# Patient Record
Sex: MALE | Race: WHITE
[De-identification: names, ages, dates, MRNs, and addresses within clinical notes are randomized per-mention and may not be internally consistent; named-entity substitution may affect disease eponyms.]

---

## 2022-03-01 ENCOUNTER — HOSPITAL ENCOUNTER (OUTPATIENT)
Dept: HOSPITAL 75 - RAD | Age: 67
End: 2022-03-01
Attending: INTERNAL MEDICINE
Payer: MEDICARE

## 2022-03-01 DIAGNOSIS — F17.210: ICD-10-CM

## 2022-03-01 DIAGNOSIS — Z12.2: Primary | ICD-10-CM

## 2022-03-01 DIAGNOSIS — R91.8: ICD-10-CM

## 2022-03-01 DIAGNOSIS — Z95.5: ICD-10-CM

## 2022-03-01 PROCEDURE — 71271 CT THORAX LUNG CANCER SCR C-: CPT

## 2022-03-01 NOTE — DIAGNOSTIC IMAGING REPORT
EXAMINATION: CT Lung Screening.



INDICATION: Screening for lung cancer, 48-pack-year history of

smoking, current smoker.



TECHNIQUE: Noncontrast, low-dose CT imaging performed according

to the lung cancer screening protocol. Auto Exposure Controls

were utilize during the CT exam to meet ALARA standards for

radiation dose reduction.



COMPARISON: None available.



FINDINGS: No significant adenopathy within the chest. No

aneurysmal dilatation of the thoracic aorta. Scattered vascular

calcifications, including extensive calcifications versus stents

within the coronary arteries. The heart is within normal limits

in size. No significant pericardial effusion. No significant

pleural effusion. The trachea is patent. No pneumothorax. Small

pleural-based 0.4 cm solid left lower lobe pulmonary nodule,

series 2, image 64. 0.6 x 0.4 cm pleural-based right lower lobe

pulmonary nodule adjacent to the fissure, series 2, image 110. No

additional suspicious pulmonary nodule or mass. Tiny

fat-containing diaphragmatic hernia noted posteriorly on the

right. 2.6 cm hypodensity within the superior pole of the left

kidney is partially visualized. Scattered osseous degenerative

changes. No acute osseous abnormality.



IMPRESSION: Sub-0.6 cm bilateral pulmonary nodules as described

above. Given size and appearance, these are felt to be benign in

appearance or behavior.



Significant vascular calcifications versus stents within the

coronary arteries.



Left renal hypodensity, only partially visualized. Statistically

this likely relates to a cyst, though this could be confirmed

with a renal ultrasound as it is technically indeterminate based

upon this examination.



LUNG-RADS CATEGORY: 2S: Benign appearance or behavior.

MODIFIER: S: Significant vascular calcifications versus advanced

coronary artery calcifications. Indeterminate left renal

hypodensity which could be further evaluated with a renal

ultrasound.

FOLLOW-UP: Continued annual low-dose CT of the chest in 12

months. Renal ultrasound could be performed to further evaluate

the partially visualized hypodensity within the left kidney if

this has not previously been evaluated.



Dictated by: 



  Dictated on workstation # [id number]

## 2022-03-10 ENCOUNTER — HOSPITAL ENCOUNTER (EMERGENCY)
Dept: HOSPITAL 75 - ER | Age: 67
Discharge: HOME | End: 2022-03-10
Payer: MEDICARE

## 2022-03-10 VITALS — BODY MASS INDEX: 26.89 KG/M2 | WEIGHT: 165.35 LBS | HEIGHT: 65.75 IN

## 2022-03-10 VITALS — DIASTOLIC BLOOD PRESSURE: 63 MMHG | SYSTOLIC BLOOD PRESSURE: 121 MMHG

## 2022-03-10 DIAGNOSIS — S50.811A: ICD-10-CM

## 2022-03-10 DIAGNOSIS — E11.22: ICD-10-CM

## 2022-03-10 DIAGNOSIS — S50.311A: Primary | ICD-10-CM

## 2022-03-10 DIAGNOSIS — E11.649: ICD-10-CM

## 2022-03-10 DIAGNOSIS — Z79.4: ICD-10-CM

## 2022-03-10 DIAGNOSIS — R07.89: ICD-10-CM

## 2022-03-10 DIAGNOSIS — D72.829: ICD-10-CM

## 2022-03-10 DIAGNOSIS — S50.812A: ICD-10-CM
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
## 2022-03-10 DIAGNOSIS — Z99.2: ICD-10-CM

## 2022-03-10 DIAGNOSIS — N18.6: ICD-10-CM

## 2022-03-10 LAB
ALBUMIN SERPL-MCNC: 4 GM/DL (ref 3.2–4.5)
ALP SERPL-CCNC: 214 U/L (ref 40–136)
ALT SERPL-CCNC: 11 U/L (ref 0–55)
ANISOCYTOSIS BLD QL SMEAR: SLIGHT
BASOPHILS # BLD AUTO: 0.1 10^3/UL (ref 0–0.1)
BASOPHILS NFR BLD AUTO: 0 % (ref 0–10)
BILIRUB SERPL-MCNC: 0.5 MG/DL (ref 0.1–1)
BUN/CREAT SERPL: 5
CALCIUM SERPL-MCNC: 10.1 MG/DL (ref 8.5–10.1)
CHLORIDE SERPL-SCNC: 98 MMOL/L (ref 98–107)
CO2 SERPL-SCNC: 22 MMOL/L (ref 21–32)
CREAT SERPL-MCNC: 9.29 MG/DL (ref 0.6–1.3)
EOSINOPHIL # BLD AUTO: 0 10^3/UL (ref 0–0.3)
EOSINOPHIL NFR BLD AUTO: 0 % (ref 0–10)
GFR SERPLBLD BASED ON 1.73 SQ M-ARVRAT: 6 ML/MIN
GLUCOSE SERPL-MCNC: 66 MG/DL (ref 70–105)
HCT VFR BLD CALC: 53 % (ref 40–54)
HGB BLD-MCNC: 17 G/DL (ref 13.3–17.7)
LYMPHOCYTES # BLD AUTO: 0.6 10^3/UL (ref 1–4)
LYMPHOCYTES NFR BLD AUTO: 4 % (ref 12–44)
MACROCYTES BLD QL SMEAR: SLIGHT
MANUAL DIFFERENTIAL PERFORMED BLD QL: YES
MCH RBC QN AUTO: 34 PG (ref 25–34)
MCHC RBC AUTO-ENTMCNC: 32 G/DL (ref 32–36)
MCV RBC AUTO: 105 FL (ref 80–99)
MICROCYTES BLD QL SMEAR: SLIGHT
MONOCYTES # BLD AUTO: 1.4 10^3/UL (ref 0–1)
MONOCYTES NFR BLD AUTO: 8 % (ref 0–12)
MONOCYTES NFR BLD: 8 %
NEUTROPHILS # BLD AUTO: 16 10^3/UL (ref 1.8–7.8)
NEUTROPHILS NFR BLD AUTO: 88 % (ref 42–75)
NEUTS BAND NFR BLD MANUAL: 83 %
NEUTS BAND NFR BLD: 1 %
PLATELET # BLD: 237 10^3/UL (ref 130–400)
PMV BLD AUTO: 9.9 FL (ref 9–12.2)
POLYCHROMASIA BLD QL SMEAR: SLIGHT
POTASSIUM SERPL-SCNC: 4.2 MMOL/L (ref 3.6–5)
PROT SERPL-MCNC: 6.8 GM/DL (ref 6.4–8.2)
SODIUM SERPL-SCNC: 142 MMOL/L (ref 135–145)
TOXIC GRANULES BLD QL SMEAR: (no result)
VARIANT LYMPHS NFR BLD MANUAL: 1 %
VARIANT LYMPHS NFR BLD MANUAL: 2 %
VARIANT LYMPHS NFR BLD MANUAL: 5 %
WBC # BLD AUTO: 18.2 10^3/UL (ref 4.3–11)

## 2022-03-10 PROCEDURE — 71045 X-RAY EXAM CHEST 1 VIEW: CPT

## 2022-03-10 PROCEDURE — 36415 COLL VENOUS BLD VENIPUNCTURE: CPT

## 2022-03-10 PROCEDURE — 80053 COMPREHEN METABOLIC PANEL: CPT

## 2022-03-10 PROCEDURE — 85027 COMPLETE CBC AUTOMATED: CPT

## 2022-03-10 PROCEDURE — 82947 ASSAY GLUCOSE BLOOD QUANT: CPT

## 2022-03-10 PROCEDURE — 85007 BL SMEAR W/DIFF WBC COUNT: CPT

## 2022-03-10 NOTE — DIAGNOSTIC IMAGING REPORT
INDICATION: Anterior chest wall pain.



TIME OF EXAM: 2:05 PM



No prior studies are available for comparison.



FINDINGS: The heart size is normal. The pulmonary vascularity is

unremarkable. The lungs are clear. No infiltrate, effusion or

pneumothorax is detected.



IMPRESSION: No acute cardiopulmonary process is detected.



Dictated by: 



  Dictated on workstation # [id number]

## 2022-03-10 NOTE — ED GENERAL
General


Stated Complaint:  LOW BLOOD SUGAR


Source of Information:  Patient, EMS


Exam Limitations:  No Limitations





History of Present Illness


Date Seen by Provider:  Mar 10, 2022


Time Seen by Provider:  12:00


Initial Comments


Oj is a 66-year-old male who presents to the emergency room by ambulance 

chief complaint low blood sugar.  Known dialysis patient. EMS reports they found

him poorly responsive blood sugar down in the 30s.  Patient states that he got 

up about 530 or 6:00 this morning, had breakfast but shortly afterwards became 

very confused, could not really see clearly could not think clearly "I thought I

was dead".  He is not clear on how the ambulance got to him.  EMS reports he was

profusely diaphoretic.  He denies any recent illnesses such as fevers, chills, 

productive cough.  He is COVID vaccinated x3.  He denies any diarrhea.  He still

occasionally makes urine no difficulties with that.  No rashes, joint pain or 

swelling.  He does not recall his daily medications but states that he takes "a 

lot".  He does not have a local doctor.  He was recently released from [facility] in [facility] and has been residing in [place] 

for about a month and assisted living here.  He does not know who his nephrol

ogist is.  He dialyzes  and Friday.





On arrival blood sugar remains 33 by Accu-Chek.  He is awake alert, oriented, 

talking no complaints.  Drinking orange juice.





Tetanus UTD





All other review of systems reviewed and negative except as stated.


Timing/Duration:  1-3 Hours


Associated Systoms:  Weakness





Allergies and Home Medications


Allergies


Coded Allergies:  


     No Known Drug Allergies (Unverified , 3/10/22)





Patient Home Medication List


Home Medication List Reviewed:  Yes


Hydrocodone/Acetaminophen (Hydrocodone-Acetamin 5-325 mg) 1 Each Tablet, 1 TAB 

PO Q6H PRN for PAIN-MODERATE (5-7)


   Prescribed by: JUAN HUDSON on 3/10/22 9184





Review of Systems


Review of Systems


Constitutional:  see HPI


EENTM:  no symptoms reported


Respiratory:  no symptoms reported


Cardiovascular:  no symptoms reported


Gastrointestinal:  no symptoms reported


Genitourinary:  no symptoms reported


Musculoskeletal:  no symptoms reported


Skin:  other (abrasions)


Psychiatric/Neurological:  No Symptoms Reported





All Other Systems Reviewed


Negative Unless Noted:  Yes





Physical Exam


Vital Signs





Vital Signs - First Documented








 3/10/22





 11:59


 


Temp 36.3


 


Pulse 77


 


Resp 16


 


B/P (MAP) 131/71 (91)


 


Pulse Ox 100


 


O2 Delivery Room Air





Capillary Refill :


Height, Weight, BMI


Height: '"


Weight: lbs. oz. kg;  BMI


Method:


General Appearance:  No Apparent Distress, WD/WN


Eyes:  Bilateral Eye Normal Inspection, Bilateral Eye PERRL, Bilateral Eye EOMI


HEENT:  PERRL/EOMI


Neck:  Normal Inspection


Respiratory:  Lungs Clear, Normal Breath Sounds, No Accessory Muscle Use, No 

Respiratory Distress, Other (tenderness to chest wall bilaterally; no 

crepitance;)


Cardiovascular:  Regular Rate, Rhythm (distant hear sounds (rate 70's))


Gastrointestinal:  Non Tender, Soft


Back:  No Vertebral Tenderness


Extremity:  Normal Inspection, Normal Range of Motion, Non Tender, No Pedal 

Edema


Neurologic/Psychiatric:  Alert, Oriented x3, No Motor/Sensory Deficits, Normal 

Mood/Affect


Skin:  Normal Color, Warm/Dry, Other (abrasions right elbow and right forearm; 

also left forearm)





Progress/Results/Core Measures


Suspected Sepsis


SIRS


Temperature: 


Pulse:  


Respiratory Rate: 


 


Laboratory Tests


3/10/22 13:10: White Blood Count 18.2H


Blood Pressure  / 


Mean: 


 





Laboratory Tests


3/10/22 12:11: 


Creatinine 9.29H, Total Bilirubin 0.5


3/10/22 13:10: Platelet Count 237








Results/Orders


Lab Results





Laboratory Tests








Test


 3/10/22


12:04 3/10/22


12:11 3/10/22


12:28 3/10/22


13:10 Range/Units


 


 


Glucometer 34 *L  92     MG/DL


 


Sodium Level  142    135-145  MMOL/L


 


Potassium Level  4.2    3.6-5.0  MMOL/L


 


Chloride Level  98      MMOL/L


 


Carbon Dioxide Level  22    21-32  MMOL/L


 


Anion Gap  22 H   5-14  MMOL/L


 


Blood Urea Nitrogen  50 H   7-18  MG/DL


 


Creatinine


 


 9.29 H


 


 


 0.60-1.30


MG/DL


 


Estimat Glomerular Filtration


Rate 


 6 


 


 


  





 


BUN/Creatinine Ratio  5     


 


Glucose Level  66 L     MG/DL


 


Calcium Level  10.1    8.5-10.1  MG/DL


 


Corrected Calcium  10.1    8.5-10.1  MG/DL


 


Total Bilirubin  0.5    0.1-1.0  MG/DL


 


Aspartate Amino Transf


(AST/SGOT) 


 24 


 


 


 5-34  U/L





 


Alanine Aminotransferase


(ALT/SGPT) 


 11 


 


 


 0-55  U/L





 


Alkaline Phosphatase  214 H     U/L


 


Total Protein  6.8    6.4-8.2  GM/DL


 


Albumin  4.0    3.2-4.5  GM/DL


 


White Blood Count


 


 


 


 18.2 H


 4.3-11.0


10^3/uL


 


Red Blood Count


 


 


 


 5.04 


 4.30-5.52


10^6/uL


 


Hemoglobin    17.0  13.3-17.7  g/dL


 


Hematocrit    53  40-54  %


 


Mean Corpuscular Volume    105 H 80-99  fL


 


Mean Corpuscular Hemoglobin    34  25-34  pg


 


Mean Corpuscular Hemoglobin


Concent 


 


 


 32 


 32-36  g/dL





 


Red Cell Distribution Width    14.2  10.0-14.5  %


 


Platelet Count


 


 


 


 237 


 130-400


10^3/uL


 


Mean Platelet Volume    9.9  9.0-12.2  fL


 


Immature Granulocyte % (Auto)    1   %


 


Neutrophils (%) (Auto)    88 H 42-75  %


 


Lymphocytes (%) (Auto)    4 L 12-44  %


 


Monocytes (%) (Auto)    8  0-12  %


 


Eosinophils (%) (Auto)    0  0-10  %


 


Basophils (%) (Auto)    0  0-10  %


 


Neutrophils # (Auto)


 


 


 


 16.0 H


 1.8-7.8


10^3/uL


 


Lymphocytes # (Auto)


 


 


 


 0.6 L


 1.0-4.0


10^3/uL


 


Monocytes # (Auto)


 


 


 


 1.4 H


 0.0-1.0


10^3/uL


 


Eosinophils # (Auto)


 


 


 


 0.0 


 0.0-0.3


10^3/uL


 


Basophils # (Auto)


 


 


 


 0.1 


 0.0-0.1


10^3/uL


 


Immature Granulocyte # (Auto)


 


 


 


 0.1 


 0.0-0.1


10^3/uL


 


Neutrophils % (Manual)    83   %


 


Lymphocytes % (Manual)    5   %


 


Monocytes % (Manual)    8   %


 


Band Neutrophils    1   %


 


Atypical Lymphocytes    2   %


 


Reactive Lymphocytes    1   %


 


Toxic Granulation    1+   


 


Polychromasia    SLIGHT   


 


Anisocytosis    SLIGHT   


 


Microcytosis    SLIGHT   


 


Macrocytosis    SLIGHT   


 


Test


 3/10/22


13:23 


 


 


 Range/Units


 


 


Glucometer 243 H      MG/DL








My Orders





Orders - JUAN HUDSON MD


Ed Iv/Invasive Line Start (3/10/22 12:12)


Cbc With Automated Diff (3/10/22 12:12)


Comprehensive Metabolic Panel (3/10/22 12:12)


Accucheck Stat ONCE (3/10/22 12:13)


D50w (Emergency) Syringe (Dextrose 50% 5 (3/10/22 12:13)


General/Regular (3/10/22 Lunch)


D50w (Emergency) Syringe (Dextrose 50% 5 (3/10/22 13:00)


Manual Differential (3/10/22 13:10)


Accucheck Stat ONCE (3/10/22 13:35)


Chest 1 View, Ap/Pa Only (3/10/22 14:05)


Hydrocodone/Apap 5/325 Tablet (Lortab 5 (3/10/22 14:15)


Accucheck Stat ONCE (3/10/22 14:34)





Medications Given in ED





Current Medications








 Medications  Dose


 Ordered  Sig/Leticia


 Route  Start Time


 Stop Time Status Last Admin


Dose Admin


 


 Acetaminophen/


 Hydrocodone Bitart  1 ea  ONCE  ONCE


 PO  3/10/22 14:15


 3/10/22 14:16 DC 3/10/22 14:31


1 EA


 


 Dextrose  50 ml  STK-MED ONCE


 .ROUTE  3/10/22 12:13


 3/10/22 12:16 DC 3/10/22 12:20


50 ML








Vital Signs/I&O











 3/10/22





 11:59


 


Temp 36.3


 


Pulse 77


 


Resp 16


 


B/P (MAP) 131/71 (91)


 


Pulse Ox 100


 


O2 Delivery Room Air





Capillary Refill :


Progress Note :  


   Time:  14:37


Progress Note


remains awake and alert and oriented. Blood sugar steadily improving. Complains 

of chest wall pain with movement and deep breath (likely from fall with low 

blood sugar).  VSS. Has a leukocytosis - likely also related to the low blood 

sugar and fall. No complaints of illness. Has eaten.  Will discharge to home 

with return precautions.  Follow up with PCP.





Diagnostic Imaging





   Diagonstic Imaging:  Xray


   Plain Films/CT/US/NM/MRI:  chest


Comments


                 [facility].


                                [place], [place]





NAME:   OJ SIMMONS


MED REC#:   [id number]


ACCOUNT#:   [id number]


PT STATUS:   REG ER


:   1955


PHYSICIAN:   JUAN HUDSON MD


ADMIT DATE:   03/10/22/ER


                                   ***Draft***


Date of Exam:03/10/22





CHEST 1 VIEW, AP/PA ONLY








INDICATION: Anterior chest wall pain.





TIME OF EXAM: 2:05 PM





No prior studies are available for comparison.





FINDINGS: The heart size is normal. The pulmonary vascularity is


unremarkable. The lungs are clear. No infiltrate, effusion or


pneumothorax is detected.





IMPRESSION: No acute cardiopulmonary process is detected.





  Dictated on workstation # [id number]








Dict:   03/10/22 1424


Trans:   03/10/22 1426


CVB [id number]





Interpreted by:     IRAIS OLIVAS MD


Electronically signed by:





Critical Care Note


Critical Care


Start Time:  12:00


Stop Time:  14:30


Total Time (minutes)


1 hour critical care time in the evaluation and management of this patient with 

hypoglycemia. Anna includes initial eval and supplementation of glucose. 

continuous monitoring with multiple blood sugar checks. eval of labs and i

maging. Discussion with patient. review of medical history.





Departure


Impression





   Primary Impression:  


   Hypoglycemia


   Additional Impressions:  


   Chest wall pain


   Diabetes


   Qualified Codes:  E11.649 - Type 2 diabetes mellitus with hypoglycemia 

   without coma; Z79.4 - Long term (current) use of insulin


   End stage renal disease on dialysis


   Abrasions of multiple sites


Disposition:  01 HOME, SELF-CARE


Condition:  Improved





Departure-Patient Inst.


Decision time for Depature:  14:39


Referrals:  


[facility]/JOSE CHO MD


Patient Instructions:  Low Blood Sugar in People With Diabetes





Add. Discharge Instructions:  


Continue to follow your diabetes diet.





Check your blood sugar 4 times a day.





Follow up closely with your primary healthcare provider.





Return to the ER for any new, emergent or concerning symptoms.





I have given you 2 days of pain medications for your chest wall pain.  Take this

only as needed every 6 hours - this medication can make you sleepy and lead to 

constipation.


Scripts


Hydrocodone/Acetaminophen (Hydrocodone-Acetamin 5-325 mg) 1 Each Tablet


1 TAB PO Q6H PRN for PAIN-MODERATE (5-7), #8 TAB


   Prov: JUAN HUDSON MD         3/10/22











JUAN HUDSON MD         Mar 10, 2022 12:19

## 2022-03-15 ENCOUNTER — HOSPITAL ENCOUNTER (OUTPATIENT)
Dept: HOSPITAL 75 - RAD | Age: 67
End: 2022-03-15
Attending: NURSE PRACTITIONER
Payer: MEDICARE

## 2022-03-15 DIAGNOSIS — M43.8X4: Primary | ICD-10-CM

## 2022-03-15 PROCEDURE — 71046 X-RAY EXAM CHEST 2 VIEWS: CPT

## 2022-03-15 PROCEDURE — 71100 X-RAY EXAM RIBS UNI 2 VIEWS: CPT

## 2022-03-15 NOTE — DIAGNOSTIC IMAGING REPORT
CLINICAL INDICATION: Patient thinks he possibly fell on table at

home. Patient has pain across lower ribs on both sides.



Exams:

1: Chest x-ray PA and lateral views.



2: X-ray of the right ribs, 3 views.



COMPARISON: Chest x-ray dated 03/10/2022. CT scan of the chest

without contrast dated 03/01/2022.



FINDINGS:

There are no right rib fractures seen. There is no pneumothorax.

There is interval development of amorphous airspace opacities in

left lung base which may represent atelectasis versus infiltrate.

Lateral view shows blunting of the costophrenic angle regions

posteriorly which may be related to minimal pleural effusions

versus lung atelectasis or infiltrate. Pulmonary vasculature is

within normal limits. There is upper limits of normal heart size.



Stable chronic anterior wedge deformity of the T11 vertebra seen

on comparison chest CT scan.



Impression:

1: There are no rib fractures and no definite acute bone fracture

seen on this exam. There is a chronic anterior wedge deformity of

the T11 vertebra.



2: Interval development of a small area of atelectasis versus

infiltrate left lung base.



3: There is blunting of the costophrenic angle regions

posteriorly which may be related to minimal pleural effusions or

minimal atelectasis or infiltrate.



Dictated by: 



  Dictated on workstation # [id number]

## 2022-03-15 NOTE — DIAGNOSTIC IMAGING REPORT
Clinical Indication: Patient thinks he possibly fell on table at

home. Patient has pain across lower ribs on both sides.



Exams:

1: Chest x-ray PA and lateral views.



2: X-ray of the right ribs, 3 views.



COMPARISON: Chest x-ray dated 03/10/2022. CT scan of the chest

without contrast dated 03/01/2022.



FINDINGS:

There are no right rib fractures seen. There is no pneumothorax.

There is interval development of amorphous airspace opacities in

left lung base which may represent atelectasis versus infiltrate.

Lateral view shows blunting of the costophrenic angle regions

posteriorly which may be related to minimal pleural effusions

versus lung atelectasis or infiltrate. Pulmonary vasculature is

within normal limits. There is upper limits of normal heart size.



Stable chronic anterior wedge deformity of the T11 vertebra seen

on comparison chest CT scan.



Impression:

1: There are no rib fractures and no definite acute bone fracture

seen on this exam. There is a chronic anterior wedge deformity of

the T11 vertebra.



2: Interval development of a small area of atelectasis versus

infiltrate left lung base.



3: There is blunting of the costophrenic angle regions

posteriorly which may be related to minimal pleural effusions or

minimal atelectasis or infiltrate.



Dictated by: 



  Dictated on workstation # [id number]

## 2022-04-07 ENCOUNTER — HOSPITAL ENCOUNTER (OUTPATIENT)
Dept: HOSPITAL 75 - ENDO | Age: 67
Discharge: HOME | End: 2022-04-07
Attending: INTERNAL MEDICINE
Payer: MEDICARE

## 2022-04-07 VITALS — DIASTOLIC BLOOD PRESSURE: 63 MMHG | SYSTOLIC BLOOD PRESSURE: 109 MMHG

## 2022-04-07 VITALS — BODY MASS INDEX: 29.84 KG/M2 | WEIGHT: 168.43 LBS | HEIGHT: 62.99 IN

## 2022-04-07 VITALS — DIASTOLIC BLOOD PRESSURE: 55 MMHG | SYSTOLIC BLOOD PRESSURE: 104 MMHG

## 2022-04-07 VITALS — DIASTOLIC BLOOD PRESSURE: 56 MMHG | SYSTOLIC BLOOD PRESSURE: 93 MMHG

## 2022-04-07 VITALS — DIASTOLIC BLOOD PRESSURE: 56 MMHG | SYSTOLIC BLOOD PRESSURE: 112 MMHG

## 2022-04-07 VITALS — DIASTOLIC BLOOD PRESSURE: 71 MMHG | SYSTOLIC BLOOD PRESSURE: 122 MMHG

## 2022-04-07 VITALS — DIASTOLIC BLOOD PRESSURE: 57 MMHG | SYSTOLIC BLOOD PRESSURE: 115 MMHG

## 2022-04-07 DIAGNOSIS — Z12.11: Primary | ICD-10-CM

## 2022-04-07 DIAGNOSIS — Z79.02: ICD-10-CM

## 2022-04-07 DIAGNOSIS — Z79.82: ICD-10-CM

## 2022-04-07 DIAGNOSIS — K63.5: ICD-10-CM

## 2022-04-07 DIAGNOSIS — Z95.5: ICD-10-CM

## 2022-04-07 DIAGNOSIS — E11.22: ICD-10-CM

## 2022-04-07 DIAGNOSIS — I12.0: ICD-10-CM

## 2022-04-07 DIAGNOSIS — N40.0: ICD-10-CM

## 2022-04-07 DIAGNOSIS — Z87.891: ICD-10-CM

## 2022-04-07 DIAGNOSIS — Z99.2: ICD-10-CM

## 2022-04-07 DIAGNOSIS — N18.6: ICD-10-CM

## 2022-04-07 DIAGNOSIS — Z66: ICD-10-CM

## 2022-04-07 DIAGNOSIS — K64.8: ICD-10-CM

## 2022-04-07 DIAGNOSIS — K62.1: ICD-10-CM

## 2022-04-07 DIAGNOSIS — Z79.4: ICD-10-CM

## 2022-04-07 NOTE — ANESTHESIA-GENERAL POST-OP
MAC


Patient Condition


Mental Status/LOC:  Same as Preop


Cardiovascular:  Satisfactory


Nausea/Vomiting:  Absent


Respiratory:  Satisfactory


Pain:  Controlled


Complications:  Absent





Post Op Complications


Complications


None





Follow Up Care/Instructions


Patient Instructions


None needed.





Anesthesiology Discharge Order


Discharge Order


Patient is doing well, no complaints, stable vital signs, no apparent adverse 

anesthesia problems.   


No complications reported per nursing.











HARMEET BROWN CRNA           Apr 7, 2022 09:48

## 2022-04-07 NOTE — PRE-OP NOTE & CONSCIOUS SEDAT
Pre-Operative Progress Note


H&P Reviewed


The H&P was reviewed, patient examined and no changes noted.


Date H&P Reviewed:  Apr 7, 2022


Time H&P Reviewed:  07:49





Conscious Sedation Pre-Proced


ASA Score


3


For ASA 3 and 4: Consider anesthesia and medical clearance. Also, for patients

with a history of failed moderate sedation consider anesthesia.

















Airway 


 


Lungs 


 


Heart 


 


 ASA score


 


 ASA 1: a normal healthy patient


 


 ASA 2:  a patient with a mild systemic disease (mid diabetes, controlled 

hypertension, obesity 


 


 ASA 3:  a patient with a severe systemic disease that limits activity  (angina,

COPD, prior Myocardial infarction)


 


 ASA 4:  a patient with an incapacitating disease that is a constant threat to 

life (CHF, renal failure)


 


 ASA 5:  a moribund patient not expected to survive 24 hrs.  (ruptured aneurysm)


 


 ASA 6:  a declared brain-dead patient whose organs are being harvested.


 


 For emergent operations, add the letter E after the classification











Mallampati Classification


Grade 2





Sedation Plan


Analgesia, Amnesia, Plan communicated to team members, Discussed options with 

patient/fam, Discussed risks with patient/fam


The patient is an appropriate candidate to undergo the planned procedure, 

sedation, and anesthesia.





The patient immediately re-assessed prior to indication.











WILDER HERNANDEZ MD               Apr 7, 2022 07:49

## 2022-04-07 NOTE — OPERATIVE REPORT
DATE OF SERVICE:  



COLONOSCOPY SUMMARY



The patient is a 66-year-old white male referred for screening colonoscopy by

Dr. Ehsan Silver.



DESCRIPTION OF PROCEDURE:

The patient was placed in the left lateral decubitus position.  Prior to

undergoing colonoscopy, digital rectal evaluation was performed.  Anal sphincter

tone was normal and the perianal reflexes intact.  Prostate is mildly enlarged

and anodular on digital inspection.  No other abnormalities were noted on

digital inspection of anal canal or distal rectal vault.  The colonoscope was

then inserted into the rectum and under direct visualization advanced to the

cecum.  The cecum was identified by identification of the ileocecal valve with

cecal strap and appendiceal orifice.  Photographic documentation was obtained. 

Quality of prep was good.



FINDINGS:

There was no evidence for external hemorrhoids.  There were several grade I

internal hemorrhoid complexes noted.  One 3 mm hyperplastic-appearing polyp was

noted in the distal rectum, which considering the patient's age, medical

comorbidities, and Plavix was left as risk of removal were outweighed by

benefit.  The remainder of the rectum was unremarkable.  The sigmoid colon,

descending colon, and splenic flexure were unremarkable.  Present in the

proximal transverse colon was an 8 mm sessile adenomatous appearing polyp.  It

was photographed and biopsied and ablated with hot forceps with no blood loss. 

The hepatic flexure, ascending colon, and cecum were unremarkable.  There was no

evidence for diverticular disease.



ASSESSMENT:

Sessile adenomatous appearing polyp was removed from the proximal transverse

colon via hot forceps with no subsequent blood loss.  The patient was advised to

resume Plavix this coming Monday.  As long as there is no evidence for dysplasia

or microscopic malignancy considering patient's age coupled with significant

medical comorbidities including diabetes and dialysis for end-stage renal

disease.  I would not recommend future screening colonoscopy.



I thank you for the referral of this pleasant gentleman.



CC:  Dr. Ehsan Silver -- requested, unable to deliver.





Job ID: [id number]

DocumentID: [id number]

Dictated Date:  04/07/2022 08:39:44

Transcription Date: 04/07/2022 11:41:10

Dictated By: WILDER HERNANDEZ MD

[facility]

## 2022-04-08 NOTE — HISTORY AND PHYSICAL
DATE OF SERVICE:  



COLONOSCOPY HISTORY AND PHYSICAL



HISTORY OF PRESENT ILLNESS:

The patient is a 66-year-old white male referred by Dr. Silver for his first

screening colonoscopy.  He recently moved to [place] and is currently a

resident of [facility].  He is not aware of any family

history for colon cancer.  Denies abdominal pain, bright red blood per rectum,

melena or change in bowel habit.  He is deemed to be of average risk, although

he is a poor historian.



PAST MEDICAL HISTORY:

Significant for longstanding hypertension and diabetes with secondary stage V

renal disease for which he is on hemodialysis on Monday, Wednesday and Fridays. 

He has history of coronary artery disease.  Reports had at least one coronary

stent placed in 2010 and none since that time.  He has been on aspirin and

Plavix since.  He knows little about his health history.  I am assuming that he

is on erythropoietin stimulating medication for anemia.  He stated that he did

not know and could not tell me anything about blood counts or the degree of his

anemia nor did he know what his HbA1cs were or give me much information about

blood sugar levels.



PAST SURGICAL HISTORY:

He reports no significant past surgeries other than his AV shunt for dialysis.



FAMILY HISTORY:

As noted in the HPI.



SOCIAL HISTORY:

He is disabled due to dialysis and end-stage renal disease, living in an

assisted living facility.  Reports no significant alcohol intake.  Past smoking

history, vague on amount.



REVIEW OF SYSTEMS:

CONSTITUTIONAL:  Denies night sweats, chills, fever, change in weight.

PULMONARY:  Denies cough, wheezing or shortness of breath.

CARDIOVASCULAR:  Denies chest pain, orthopnea, PND or pedal edema.  Denies

syncope or presyncope.

GASTROINTESTINAL:  As noted in the HPI.



PHYSICAL EXAMINATION:

GENERAL:  Reveals a white male who appeared to be in no acute distress.

VITAL SIGNS:  Heart rate 78 and regular.  Blood pressure 120/64.

HEENT:  Other than somewhat sallow complexion, is unremarkable.  There was no

evidence for pallor.

CHEST:  Clear to auscultation.

CARDIOVASCULAR:  Reveals a regular rate and rhythm without significant murmur,

S3 or S4.

ABDOMEN:  Soft, supple without mass, organomegaly or tenderness.  Bowel sounds

positive.

EXTREMITIES:  Reveal no cyanosis, clubbing or edema.



ASSESSMENT AND PLAN:

The patient is being set up for apparently his first screening colonoscopy.  Due

to end-stage renal disease, will use MiraLax based prep for which instructions

were given and questions were answered.  Electronic medical record was reviewed.

 We will have the patient hold aspirin and Plavix one week prior to the

procedure and the day before the procedure on his liquid diet, we will have him

cut back on his regular insulin from usual 7 units to 5 before meals.  Discussed

medication for hypoglycemia.



I thank you for the referral of this pleasant gentleman.



Sincerely,





Job ID: [id number]

DocumentID: [id number]

Dictated Date:  03/08/2022 10:03:47

Transcription Date: 03/08/2022 11:17:45

Dictated By: WILDER HERNANDEZ MD









<Dictated by WILDER HERNANDEZ MD> 

<Electronically signed by WILDER HERNANDEZ MD> 03/20/22 1535

MTDD

## 2022-05-31 ENCOUNTER — HOSPITAL ENCOUNTER (OUTPATIENT)
Dept: HOSPITAL 75 - RAD | Age: 67
End: 2022-05-31
Payer: MEDICARE

## 2022-05-31 DIAGNOSIS — N28.1: Primary | ICD-10-CM

## 2022-05-31 PROCEDURE — 76770 US EXAM ABDO BACK WALL COMP: CPT

## 2022-05-31 NOTE — DIAGNOSTIC IMAGING REPORT
PROCEDURE: 

US Renal Bilateral.



TECHNIQUE: 

Multiple Real-time grayscale images were obtained over the

kidneys in various projections bilaterally.



INDICATION: 

Abnormal CT study demonstrating a lesion in the left kidney.



COMPARISON:    

CT chest screening study from 03/01/2022.



FINDINGS:

The right kidney measures 8.6 x 3.1 x 3.9 cm and the left kidney

measures 7.5 x 3.8 x 3.5 cm. The right kidney demonstrates a

simple appearing cyst in the lower pole measuring 3.4 x 2.9 x 3.6

cm. A cyst in the upper pole of the left kidney measures 3.2 x

2.3 x 2.2 cm. This likely accounts for the CT abnormality. No

calculus or hydronephrosis is seen. There is some mild renal

cortical thinning bilaterally.



IMPRESSION: 

Bilateral renal cysts. No other significant abnormality is seen.



Dictated by: 



  Dictated on workstation # [id number]

## 2022-07-29 ENCOUNTER — HOSPITAL ENCOUNTER (OUTPATIENT)
Dept: HOSPITAL 75 - LABNPT | Age: 67
End: 2022-07-29
Attending: INTERNAL MEDICINE
Payer: MEDICARE

## 2022-07-29 DIAGNOSIS — D75.1: Primary | ICD-10-CM

## 2022-07-29 LAB
BASOPHILS # BLD AUTO: 0.1 10^3/UL (ref 0–0.1)
BASOPHILS NFR BLD AUTO: 1 % (ref 0–10)
BASOPHILS NFR BLD MANUAL: 0 %
EOSINOPHIL # BLD AUTO: 0.1 10^3/UL (ref 0–0.3)
EOSINOPHIL NFR BLD AUTO: 1 % (ref 0–10)
EOSINOPHIL NFR BLD MANUAL: 0 %
HCT VFR BLD CALC: 59 % (ref 40–54)
HGB BLD-MCNC: 19.4 G/DL (ref 13.3–17.7)
LYMPHOCYTES # BLD AUTO: 1.4 10^3/UL (ref 1–4)
LYMPHOCYTES NFR BLD AUTO: 14 % (ref 12–44)
MCH RBC QN AUTO: 34 PG (ref 25–34)
MCHC RBC AUTO-ENTMCNC: 33 G/DL (ref 32–36)
MCV RBC AUTO: 103 FL (ref 80–99)
MONOCYTES # BLD AUTO: 1.5 10^3/UL (ref 0–1)
MONOCYTES NFR BLD AUTO: 15 % (ref 0–12)
MONOCYTES NFR BLD: 10 %
NEUTROPHILS # BLD AUTO: 7.1 10^3/UL (ref 1.8–7.8)
NEUTROPHILS NFR BLD AUTO: 70 % (ref 42–75)
NEUTS BAND NFR BLD MANUAL: 76 %
NEUTS BAND NFR BLD: 0 %
PLATELET # BLD: 217 10^3/UL (ref 130–400)
PMV BLD AUTO: 9.9 FL (ref 9–12.2)
RBC MORPH BLD: NORMAL
RETICS #: 128 10E9/UL (ref 24–90)
RETICS/RBC NFR: 2.24 % (ref 0.5–2.4)
VARIANT LYMPHS NFR BLD MANUAL: 14 %
WBC # BLD AUTO: 10.2 10^3/UL (ref 4.3–11)

## 2022-07-29 PROCEDURE — 85027 COMPLETE CBC AUTOMATED: CPT

## 2022-07-29 PROCEDURE — 85045 AUTOMATED RETICULOCYTE COUNT: CPT

## 2022-07-29 PROCEDURE — 85055 RETICULATED PLATELET ASSAY: CPT

## 2022-07-29 PROCEDURE — 85007 BL SMEAR W/DIFF WBC COUNT: CPT

## 2022-08-19 ENCOUNTER — HOSPITAL ENCOUNTER (EMERGENCY)
Dept: HOSPITAL 75 - ER | Age: 67
Discharge: NURSING, REHAB OR LONG-TERM CARE | End: 2022-08-19
Payer: MEDICARE

## 2022-08-19 VITALS — SYSTOLIC BLOOD PRESSURE: 109 MMHG | DIASTOLIC BLOOD PRESSURE: 64 MMHG

## 2022-08-19 VITALS — WEIGHT: 179.9 LBS | HEIGHT: 62.99 IN | BODY MASS INDEX: 31.88 KG/M2

## 2022-08-19 DIAGNOSIS — Z99.2: ICD-10-CM

## 2022-08-19 DIAGNOSIS — Z79.4: ICD-10-CM

## 2022-08-19 DIAGNOSIS — N18.6: ICD-10-CM

## 2022-08-19 DIAGNOSIS — T82.838A: Primary | ICD-10-CM

## 2022-08-19 DIAGNOSIS — E11.22: ICD-10-CM

## 2022-08-19 DIAGNOSIS — F17.210: ICD-10-CM

## 2022-08-19 DIAGNOSIS — I12.0: ICD-10-CM

## 2022-08-19 LAB
APTT BLD: 34 SEC (ref 24–35)
BASOPHILS # BLD AUTO: 0.1 10^3/UL (ref 0–0.1)
BASOPHILS NFR BLD AUTO: 1 % (ref 0–10)
EOSINOPHIL # BLD AUTO: 0.1 10^3/UL (ref 0–0.3)
EOSINOPHIL NFR BLD AUTO: 1 % (ref 0–10)
HCT VFR BLD CALC: 57 % (ref 40–54)
HGB BLD-MCNC: 19 G/DL (ref 13.3–17.7)
INR PPP: 0.9 (ref 0.8–1.4)
LYMPHOCYTES # BLD AUTO: 0.9 10^3/UL (ref 1–4)
LYMPHOCYTES NFR BLD AUTO: 11 % (ref 12–44)
MANUAL DIFFERENTIAL PERFORMED BLD QL: NO
MCH RBC QN AUTO: 34 PG (ref 25–34)
MCHC RBC AUTO-ENTMCNC: 33 G/DL (ref 32–36)
MCV RBC AUTO: 102 FL (ref 80–99)
MONOCYTES # BLD AUTO: 1.3 10^3/UL (ref 0–1)
MONOCYTES NFR BLD AUTO: 15 % (ref 0–12)
NEUTROPHILS # BLD AUTO: 5.9 10^3/UL (ref 1.8–7.8)
NEUTROPHILS NFR BLD AUTO: 71 % (ref 42–75)
PLATELET # BLD: 231 10^3/UL (ref 130–400)
PMV BLD AUTO: 9.9 FL (ref 9–12.2)
PROTHROMBIN TIME: 13 SEC (ref 12.2–14.7)
WBC # BLD AUTO: 8.2 10^3/UL (ref 4.3–11)

## 2022-08-19 PROCEDURE — 85730 THROMBOPLASTIN TIME PARTIAL: CPT

## 2022-08-19 PROCEDURE — 36415 COLL VENOUS BLD VENIPUNCTURE: CPT

## 2022-08-19 PROCEDURE — 85610 PROTHROMBIN TIME: CPT

## 2022-08-19 PROCEDURE — 85025 COMPLETE CBC W/AUTO DIFF WBC: CPT

## 2022-08-19 NOTE — ED GENERAL
General


Chief Complaint:  General Problems/Pain


Stated Complaint:  DIALYSIS FISTULA BLEEDING


Nursing Triage Note:  


PT TO ROOM 01 VIA [facility] FROM [facility] WITH C/O DIALYSIS SHUNT IN RIGHT 


UPPER ARM BLEEDING. PT STATES HE HAD DIALYSIS TODAY. DRESSING IN PLACE BY 


NURSING HOME STAFF PRIOR TO EMS ARRIVAL AT FACILITY. PT DENIES ANY OTHER C/O.


Source of Information:  Patient (VERY POOR HISTORIAN)





History of Present Illness


Date Seen by Provider:  Aug 19, 2022


Time Seen by Provider:  11:49


Initial Comments


PT ARRIVES VIA EMS FROM [facility]


PT HAD DIALYSIS THIS MORNING, FINISHING AROUND 1030 THIS AM


JUST PRIOR TO ARRIVAL, PT REPORTEDLY HAD SUDDEN SPONTANEOUS BLEEDING FROM HIS 

RIGHT UPPER ARM FISTULA SITE. 


[facility] STAFF PLACED A PRESSURE DRESSING ON THE AREA PRIOR TO ARRIVAL, 

AND THAT DRESSING IS CLEAN AND DRY 





PT VOICES NO COMPLAINTS, DENIES PAIN, DENIES PARESTHESIAS OR MOTOR DEFICITS. 

DENIES DIZZINESS OR SYNCOPE





PT IS ON ASPIRIN AND PLAVIX


PT DOES NOT KNOW WHAT MEDICATIONS HE HAS TAKEN TODAY





PT IS NOT SURE WHERE OR WHEN HE HAD THE FISTULA PLACED--THINKS IT WAS POSSIBLY 4

YEARS AGO, BUT DOES NOT REMEMBER IF IT WAS [place] OR [place]


STATES HE MOVED HERE IN JANUARY OF THIS YEAR--HAD BEEN LIVING IN BOTH 

[place] AND [place] PRIOR TO MOVING HERE--STATES HIS SON MOVED HIM HERE. 





PT DOES NOT KNOW WHO HIS NEPHROLOGIST IS, BUT HE GOES TO [facility]





LATER, HE STATES THAT HE NOTICED IT WAS BLEEDING AND HE HAD BLOOD ON HIS SHIRT, 

WHILE RIDING IN THE CAR BACK FROM DIALYSIS TO [facility]. HE STATES HE 

WENT TO THE BATHROOM WHEN HE GOT TO [place], AND THEN IT "STARTED GUSHING"





PCP: DR. KARIMI





Allergies and Home Medications


Allergies


Coded Allergies:  


     No Known Drug Allergies (Unverified , 3/10/22)





Patient Home Medication List


Home Medication List Reviewed:  Yes


Aspirin (Aspirin) 81 Mg Tab.chew, 81 MG PO DAILY, (Reported)


   Entered as Reported by: INEZ LINTON on 4/1/22 1645


Bupropion HCl (Bupropion HCl) 100 Mg Tablet, 100 MG PO DAILY@1700, (Reported)


   Entered as Reported by: INEZ LINTON on 4/1/22 1645


Bupropion HCl (Bupropion HCl ER) 200 Mg Tablet.er, 200 MG PO DAILY, (Reported)


   Entered as Reported by: INEZ LINTON on 4/1/22 1645


Carvedilol (Carvedilol) 3.125 Mg Tablet, 3.125 MG PO BID, (Reported)


   Entered as Reported by: INEZ LINTON on 4/1/22 1645


Clopidogrel Bisulfate (Clopidogrel) 75 Mg Tablet, 75 MG PO DAILY, (Reported)


   Entered as Reported by: INEZ LINTON on 4/1/22 1645


Docusate Sodium (Docusate Sodium) 100 Mg Capsule, 100 MG PO DAILY, (Reported)


   Entered as Reported by: INEZ LINTON on 4/1/22 1645


Folic Acid/Vitamin B Comp W-C (Nephro-Carolann Tablet) 0.8 Mg Tablet, 0.8 MG PO 

DAILY, (Reported)


   Entered as Reported by: INEZ LINTON on 4/1/22 1645


Hydrocodone/Acetaminophen (Hydrocodone-Acetamin 5-325 mg) 1 Each Tablet, 1 TAB 

PO Q4H PRN for PAIN-MODERATE (5-7), (Reported)


   Entered as Reported by: INEZ LINTON on 4/1/22 1645


Insulin Aspart (Novolog) 100 Unit/1 Ml Susp, 8-15 UNITS SQ BIDAC, (Reported)


   Entered as Reported by: INEZ LINTON on 4/1/22 1645


Levothyroxine Sodium (Levothyroxine Sodium) 25 Mcg Tablet, 25 MCG PO DAILY, 

(Reported)


   Entered as Reported by: INEZ LINTON on 4/1/22 1645


Multivit-Min/FA/Lycopen/Lutein (Men 50 Plus Multivitamin Tab) 1 Each Tablet, 1 

EACH PO DAILY, (Reported)


   Entered as Reported by: INEZ LINTON on 4/1/22 1645


Pantoprazole Sodium (Pantoprazole Sodium) 40 Mg Tablet.dr, 40 MG PO DAILY, 

(Reported)


   Entered as Reported by: INEZ LINTON on 4/1/22 1645


Sertraline HCl (Sertraline HCl) 50 Mg Tablet, 50 MG PO DAILY, (Reported)


   Entered as Reported by: INEZ LINTON on 4/1/22 1645


Sevelamer Carbonate (Sevelamer Carbonate) 800 Mg Tablet, 3 TAB PO TID, 

(Reported)


   Entered as Reported by: INEZ ILNTON on 4/1/22 1645


Sodium Zirconium Cyclosilicate (Lokelma) 10 Gm Powd.pack, 10 GM PO DAILY, 

(Reported)


   Entered as Reported by: INEZ LINTON on 4/1/22 1645





Review of Systems


Review of Systems


Constitutional:  no symptoms reported


Respiratory:  no symptoms reported


Cardiovascular:  no symptoms reported


Musculoskeletal:  see HPI


Skin:  see HPI


Psychiatric/Neurological:  No Symptoms Reported


Hematologic/Lymphatic:  See HPI





Past Medical-Social-Family Hx


Patient Social History


Tobacco Use?:  Yes


Tobacco type used:  Cigarettes


Smoking Status:  Heavy Tobacco Smoker


Smokeless Tobacco Frequency:  Never a User


Use of E-Cig and/or Vaping dev:  No


Use of E-Cig and/or Vaping Michele:  Never a User


Substance use?:  No


Alcohol Use?:  No


Pt feels they are or have been:  No





Immunizations Up To Date


First/Initial COVID19 Vaccinat:  UNKNOWN DATE


Second COVID19 Vaccination [place]:  UNKNOWN DATE





Seasonal Allergies


Seasonal Allergies:  No





Past Medical History


Surgeries:  Yes (RIGHT UPPER ARM A-V DIALYSIS SHUNT/GRAFT;CARDIAC CATH-STENT X 

1)


Cardiac, Coronary Stent, Dialysis, Vascular Surgery


Respiratory:  No


Cardiac:  Yes (stent x1 2010)


Coronary Artery Disease, Hypertension


Neurological:  No


Genitourinary:  Yes (ESRD ON DIALYSIS M-W-F)


Renal Failure, Dialysis


Gastrointestinal:  Yes


Gastroesophageal Reflux


Musculoskeletal:  No


Endocrine:  Yes


Parathyroid Disease, Diabetes, Insulin dep, Hypothyroidsim


HEENT:  No


Cancer:  No


Psychosocial:  Yes


Anxiety, Depression


Integumentary:  No


Blood Disorders:  Yes (anemia)





Physical Exam


Vital Signs





Vital Signs - First Documented








 8/19/22 8/19/22





 11:46 13:03


 


Temp 36.4 


 


Pulse 96 


 


Resp 16 


 


B/P (MAP) 95/50 (65) 


 


Pulse Ox  97


 


O2 Delivery Room Air 





Capillary Refill : Less Than 3 Seconds


Height, Weight, BMI


Height: '"


Weight: lbs. oz. kg; 31.00 BMI


Method:


General Appearance:  No Apparent Distress, WD/WN


Respiratory:  Normal Breath Sounds


Cardiovascular:  Regular Rate, Rhythm


Extremity:  Normal Range of Motion, Other (LEFT UPPER ARM WITH DRESSING IN 

PLACE. OUTER LAYERS ARE CLEAN AND DRY. SHIRT HAS A MODERATE AMOUNT OF DRIED 

BLOOD ON IT.   NO DISTAL SWELLING. DISTAL MOTOR/SENSORY/VASCULAR INTACT--FINGERS

ARE PINK AND WARM WITH GOOD CAPILLARY REFILL. )


Neurologic/Psychiatric:  Alert, Oriented x3 (BUT VERY POOR MEMORY), No 

Motor/Sensory Deficits (GROSSLY INTACT)


Skin:  Normal Color, Warm/Dry





Progress/Results/Core Measures


Suspected Sepsis


SIRS


Temperature: 


Pulse: 96 


Respiratory Rate: 16


 


Laboratory Tests


8/19/22 12:05: White Blood Count 8.2


Blood Pressure 95 /50 


Mean: 65


 


Laboratory Tests


8/19/22 12:05: 


INR Comment 0.9, Platelet Count 231








Results/Orders


Lab Results





Laboratory Tests








Test


 8/19/22


12:05 Range/Units


 


 


White Blood Count


 8.2 


 4.3-11.0


10^3/uL


 


Red Blood Count


 5.62 H


 4.30-5.52


10^6/uL


 


Hemoglobin 19.0 H 13.3-17.7  g/dL


 


Hematocrit 57 H 40-54  %


 


Mean Corpuscular Volume 102 H 80-99  fL


 


Mean Corpuscular Hemoglobin 34  25-34  pg


 


Mean Corpuscular Hemoglobin


Concent 33 


 32-36  g/dL





 


Red Cell Distribution Width 14.4  10.0-14.5  %


 


Platelet Count


 231 


 130-400


10^3/uL


 


Mean Platelet Volume 9.9  9.0-12.2  fL


 


Immature Granulocyte % (Auto) 1   %


 


Neutrophils (%) (Auto) 71  42-75  %


 


Lymphocytes (%) (Auto) 11 L 12-44  %


 


Monocytes (%) (Auto) 15 H 0-12  %


 


Eosinophils (%) (Auto) 1  0-10  %


 


Basophils (%) (Auto) 1  0-10  %


 


Neutrophils # (Auto)


 5.9 


 1.8-7.8


10^3/uL


 


Lymphocytes # (Auto)


 0.9 L


 1.0-4.0


10^3/uL


 


Monocytes # (Auto)


 1.3 H


 0.0-1.0


10^3/uL


 


Eosinophils # (Auto)


 0.1 


 0.0-0.3


10^3/uL


 


Basophils # (Auto)


 0.1 


 0.0-0.1


10^3/uL


 


Immature Granulocyte # (Auto)


 0.1 


 0.0-0.1


10^3/uL


 


Prothrombin Time 13.0  12.2-14.7  SEC


 


INR Comment 0.9  0.8-1.4  


 


Activated Partial


Thromboplast Time 34 


 24-35  SEC











My Orders





Orders - CORRINE ONTIVEROS DO


Cbc With Automated Diff (8/19/22 11:57)


Protime With Inr (8/19/22 11:57)


Partial Thromboplastin Time (8/19/22 11:57)





Vital Signs/I&O











 8/19/22 8/19/22





 11:46 13:03


 


Temp 36.4 36.8


 


Pulse 96 69


 


Resp 16 17


 


B/P (MAP) 95/50 (65) 109/64


 


Pulse Ox  97


 


O2 Delivery Room Air Room Air





Capillary Refill : Less Than 3 Seconds








Blood Pressure Mean:                    65








Progress Note :  


Progress Note


DRESSING WAS REMOVED ON ARRIVAL. 


THERE IS NO BLEEDING OR HEMATOMA /EXCESSIVE BRUISING OR SWELLING AT THE SITE AT 

THIS TIME


NO TENDERNESS AT THE SITE


DISTAL CAP REFILL IS LESS THAN 3 SECONDS. 





AREA RE-DRESSED WITH 4X4'S AND CLING GAUZE. 





NO BLEEDING THROUGH DRESSING DURING ER STAY--PT WAS OBSERVED FOR OVER AN HOUR IN

ER. 


HGB IS 19





Departure


Impression





   Primary Impression:  


   Bleeding from dialysis shunt


Disposition:  03 XFER SNF


Condition:  Stable





Departure-Patient Inst.


Decision time for Depature:  12:51


Referrals:  


JOSE KARIMI MD (PCP/Family)


Primary Care Physician


Patient Instructions:  Arteriovenous Fistula for Dialysis (DC)





Add. Discharge Instructions:  


CONTINUE ALL YOUR REGULAR ORDERS





LEAVE DRESSING IN PLACE FOR 24 HOURS





RETURN TO ER IF AREA BEGINS TO BLEED AGAIN. 





All discharge instructions reviewed with patient and/or family. Voiced 

understanding.











CORRINE ONTIVEROS DO                 Aug 19, 2022 12:47

## 2022-12-15 ENCOUNTER — HOSPITAL ENCOUNTER (EMERGENCY)
Dept: HOSPITAL 75 - ER | Age: 67
Discharge: HOME | End: 2022-12-15
Payer: MEDICARE

## 2022-12-15 VITALS — WEIGHT: 163.14 LBS | HEIGHT: 65.75 IN | BODY MASS INDEX: 26.53 KG/M2

## 2022-12-15 VITALS — DIASTOLIC BLOOD PRESSURE: 64 MMHG | SYSTOLIC BLOOD PRESSURE: 113 MMHG

## 2022-12-15 DIAGNOSIS — E11.22: ICD-10-CM

## 2022-12-15 DIAGNOSIS — Z99.2: ICD-10-CM

## 2022-12-15 DIAGNOSIS — N18.6: ICD-10-CM

## 2022-12-15 DIAGNOSIS — I12.0: ICD-10-CM

## 2022-12-15 DIAGNOSIS — E11.649: Primary | ICD-10-CM

## 2022-12-15 DIAGNOSIS — Z79.4: ICD-10-CM

## 2022-12-15 LAB
BASOPHILS # BLD AUTO: 0.1 10^3/UL (ref 0–0.1)
BASOPHILS NFR BLD AUTO: 1 % (ref 0–10)
BASOPHILS NFR BLD MANUAL: 1 %
BUN/CREAT SERPL: 3
CALCIUM SERPL-MCNC: 9.7 MG/DL (ref 8.5–10.1)
CHLORIDE SERPL-SCNC: 99 MMOL/L (ref 98–107)
CO2 SERPL-SCNC: 19 MMOL/L (ref 21–32)
CREAT SERPL-MCNC: 7.53 MG/DL (ref 0.6–1.3)
EOSINOPHIL # BLD AUTO: 0.1 10^3/UL (ref 0–0.3)
EOSINOPHIL NFR BLD AUTO: 2 % (ref 0–10)
EOSINOPHIL NFR BLD MANUAL: 0 %
GFR SERPLBLD BASED ON 1.73 SQ M-ARVRAT: 7 ML/MIN
GLUCOSE SERPL-MCNC: 185 MG/DL (ref 70–105)
HCT VFR BLD CALC: 59 % (ref 40–54)
HGB BLD-MCNC: 19.4 G/DL (ref 13.3–17.7)
LYMPHOCYTES # BLD AUTO: 1.1 10^3/UL (ref 1–4)
LYMPHOCYTES NFR BLD AUTO: 13 % (ref 12–44)
MANUAL DIFFERENTIAL PERFORMED BLD QL: YES
MCH RBC QN AUTO: 34 PG (ref 25–34)
MCHC RBC AUTO-ENTMCNC: 33 G/DL (ref 32–36)
MCV RBC AUTO: 104 FL (ref 80–99)
MONOCYTES # BLD AUTO: 1.8 10^3/UL (ref 0–1)
MONOCYTES NFR BLD AUTO: 22 % (ref 0–12)
MONOCYTES NFR BLD: 18 %
NEUTROPHILS # BLD AUTO: 5.1 10^3/UL (ref 1.8–7.8)
NEUTROPHILS NFR BLD AUTO: 62 % (ref 42–75)
NEUTS BAND NFR BLD MANUAL: 63 %
NEUTS BAND NFR BLD: 3 %
PLATELET # BLD: 211 10^3/UL (ref 130–400)
PMV BLD AUTO: 9.9 FL (ref 9–12.2)
POTASSIUM SERPL-SCNC: 5.4 MMOL/L (ref 3.6–5)
RBC MORPH BLD: NORMAL
SODIUM SERPL-SCNC: 137 MMOL/L (ref 135–145)
VARIANT LYMPHS NFR BLD MANUAL: 15 %
WBC # BLD AUTO: 8.2 10^3/UL (ref 4.3–11)

## 2022-12-15 PROCEDURE — 80048 BASIC METABOLIC PNL TOTAL CA: CPT

## 2022-12-15 PROCEDURE — 85007 BL SMEAR W/DIFF WBC COUNT: CPT

## 2022-12-15 PROCEDURE — 82947 ASSAY GLUCOSE BLOOD QUANT: CPT

## 2022-12-15 PROCEDURE — 36415 COLL VENOUS BLD VENIPUNCTURE: CPT

## 2022-12-15 PROCEDURE — 85027 COMPLETE CBC AUTOMATED: CPT

## 2022-12-15 NOTE — ED GENERAL
General


Stated Complaint:  LOW BLOOD SUGAR | UNRESPONSIVE


Source of Information:  Patient, EMS


Exam Limitations:  No Limitations





History of Present Illness


Date Seen by Provider:  Dec 15, 2022


Time Seen by Provider:  11:26


Initial Comments


Patient is a 67-year-old male history of insulin-dependent diabetes, states he 

was standing in his room at [facility] this morning watching TV when he 

fell to the ground.  Upon staff evaluation he was noted to have a blood sugar of

21.  EMS responded, the patient was given both oral and IM glucagon as well as 

oral glucose.  He denies injury from his fall.  He denies any recent illnesses 

such as fevers, productive cough, sore throat.  No flulike symptoms.  No pain in

his chest abdomen, pelvis or extremities.  He states this happens occasionally. 

He did eat breakfast this morning.  He had gradual improvement of his blood s

ugar from 21-39 to 55 .  He is eating some felicity crackers and drinking a Sprite

on my entry into the room.  Alert and oriented.





All other review of systems reviewed and negative except as stated.


Timing/Duration:  1 Hour


Severity:  Severe


Associated Systoms:  Denies Symptoms





Allergies and Home Medications


Allergies


Coded Allergies:  


     No Known Drug Allergies (Unverified , 3/10/22)





Patient Home Medication List


Home Medication List Reviewed:  Yes


Aspirin (Aspirin) 81 Mg Tab.chew, 81 MG PO DAILY, (Reported)


   Entered as Reported by: INEZ LINTON on 4/1/22 1645


Bupropion HCl (Bupropion HCl) 100 Mg Tablet, 100 MG PO DAILY@1700, (Reported)


   Entered as Reported by: INEZ LINTON on 4/1/22 1645


Bupropion HCl (Bupropion HCl ER) 200 Mg Tablet.er, 200 MG PO DAILY, (Reported)


   Entered as Reported by: INEZ LINTON on 4/1/22 1645


Carvedilol (Carvedilol) 3.125 Mg Tablet, 3.125 MG PO BID, (Reported)


   Entered as Reported by: INEZ LINTON on 4/1/22 1645


Clopidogrel Bisulfate (Clopidogrel) 75 Mg Tablet, 75 MG PO DAILY, (Reported)


   Entered as Reported by: INEZ LINTON on 4/1/22 1645


Docusate Sodium (Docusate Sodium) 100 Mg Capsule, 100 MG PO DAILY, (Reported)


   Entered as Reported by: INEZ LINTON on 4/1/22 1645


Folic Acid/Vitamin B Comp W-C (Nephro-Carolann Tablet) 0.8 Mg Tablet, 0.8 MG PO 

DAILY, (Reported)


   Entered as Reported by: INEZ LINTON on 4/1/22 1645


Hydrocodone/Acetaminophen (Hydrocodone-Acetamin 5-325 mg) 1 Each Tablet, 1 TAB 

PO Q4H PRN for PAIN-MODERATE (5-7), (Reported)


   Entered as Reported by: INEZ LINTON on 4/1/22 1645


Insulin Aspart (Novolog) 100 Unit/1 Ml Susp, 8-15 UNITS SQ BIDAC, (Reported)


   Entered as Reported by: INEZ LINTON on 4/1/22 1645


Levothyroxine Sodium (Levothyroxine Sodium) 25 Mcg Tablet, 25 MCG PO DAILY, 

(Reported)


   Entered as Reported by: INEZ LINTON on 4/1/22 1645


Multivit-Min/FA/Lycopen/Lutein (Men 50 Plus Multivitamin Tab) 1 Each Tablet, 1 

EACH PO DAILY, (Reported)


   Entered as Reported by: INEZ LINTON on 4/1/22 1645


Pantoprazole Sodium (Pantoprazole Sodium) 40 Mg Tablet.dr, 40 MG PO DAILY, 

(Reported)


   Entered as Reported by: INEZ LINTON on 4/1/22 1645


Sertraline HCl (Sertraline HCl) 50 Mg Tablet, 50 MG PO DAILY, (Reported)


   Entered as Reported by: INEZ LINTON on 4/1/22 1645


Sevelamer Carbonate (Sevelamer Carbonate) 800 Mg Tablet, 3 TAB PO TID, 

(Reported)


   Entered as Reported by: INEZ LINTON on 4/1/22 1645


Sodium Zirconium Cyclosilicate (Lokelma) 10 Gm Powd.pack, 10 GM PO DAILY, 

(Reported)


   Entered as Reported by: INEZ LINTON on 4/1/22 1645





Review of Systems


Review of Systems


Constitutional:  see HPI


EENTM:  no symptoms reported


Respiratory:  no symptoms reported


Cardiovascular:  no symptoms reported


Gastrointestinal:  no symptoms reported


Genitourinary:  no symptoms reported


Musculoskeletal:  no symptoms reported


Skin:  no symptoms reported





All Other Systems Reviewed


Negative Unless Noted:  Yes





Past Medical-Social-Family Hx


Immunizations Up To Date


First/Initial COVID19 Vaccinat:  UNKNOWN DATE


Second COVID19 Vaccination [place]:  UNKNOWN DATE





Seasonal Allergies


Seasonal Allergies:  No





Past Medical History


Surgeries:  Yes (RIGHT UPPER ARM A-V DIALYSIS SHUNT/GRAFT;CARDIAC CATH-STENT X 

1)


Cardiac, Coronary Stent, Dialysis, Vascular Surgery


Respiratory:  No


Cardiac:  Yes (stent x1 2010)


Coronary Artery Disease, Hypertension


Neurological:  No


Genitourinary:  Yes (ESRD ON DIALYSIS M-W-F)


Renal Failure, Dialysis


Gastrointestinal:  Yes


Gastroesophageal Reflux


Musculoskeletal:  No


Endocrine:  Yes


Parathyroid Disease, Diabetes, Insulin dep, Hypothyroidsim


HEENT:  No


Cancer:  No


Psychosocial:  Yes


Anxiety, Depression


Integumentary:  No


Blood Disorders:  Yes (anemia)





Physical Exam


Vital Signs





Vital Signs - First Documented








 12/15/22





 11:21


 


Temp 35.7


 


Pulse 70


 


Resp 16


 


B/P (MAP) 131/99 (110)


 


Pulse Ox 99


 


O2 Delivery Room Air





Capillary Refill :


Height, Weight, BMI


Height: '"


Weight: lbs. oz. kg; 31.00 BMI


Method:


General Appearance:  No Apparent Distress, WD/WN


Eyes:  Bilateral Eye Normal Inspection, Bilateral Eye PERRL, Bilateral Eye EOMI


Neck:  Normal Inspection


Respiratory:  Lungs Clear, Normal Breath Sounds, No Accessory Muscle Use, No 

Respiratory Distress


Cardiovascular:  Regular Rate, Rhythm, Normal Peripheral Pulses


Gastrointestinal:  Non Tender, Soft


Extremity:  Normal Inspection, Normal Range of Motion


Neurologic/Psychiatric:  Alert, Oriented x3, No Motor/Sensory Deficits, Normal 

Mood/Affect, CNs II-XII Norm as Tested


Skin:  Normal Color, Warm/Dry





Progress/Results/Core Measures


Suspected Sepsis


SIRS


Temperature: 


Pulse:  


Respiratory Rate: 


 


Laboratory Tests


12/15/22 11:20: White Blood Count 8.2


Blood Pressure  / 


Mean: 


 











Laboratory Tests


12/15/22 11:20: Platelet Count 211


12/15/22 13:34: 





Results/Orders


Lab Results





Laboratory Tests








Test


 12/15/22


11:20 12/15/22


12:13 12/15/22


13:34 Range/Units


 


 


White Blood Count


 8.2 


 


 


 4.3-11.0


10^3/uL


 


Red Blood Count


 5.71 H


 


 


 4.30-5.52


10^6/uL


 


Hemoglobin 19.4 H   13.3-17.7  g/dL


 


Hematocrit 59 H   40-54  %


 


Mean Corpuscular Volume 104 H   80-99  fL


 


Mean Corpuscular Hemoglobin 34    25-34  pg


 


Mean Corpuscular Hemoglobin


Concent 33 


 


 


 32-36  g/dL





 


Red Cell Distribution Width 13.9    10.0-14.5  %


 


Platelet Count


 211 


 


 


 130-400


10^3/uL


 


Mean Platelet Volume 9.9    9.0-12.2  fL


 


Immature Granulocyte % (Auto) 0     %


 


Neutrophils (%) (Auto) 62    42-75  %


 


Lymphocytes (%) (Auto) 13    12-44  %


 


Monocytes (%) (Auto) 22 H   0-12  %


 


Eosinophils (%) (Auto) 2    0-10  %


 


Basophils (%) (Auto) 1    0-10  %


 


Neutrophils # (Auto)


 5.1 


 


 


 1.8-7.8


10^3/uL


 


Lymphocytes # (Auto)


 1.1 


 


 


 1.0-4.0


10^3/uL


 


Monocytes # (Auto)


 1.8 H


 


 


 0.0-1.0


10^3/uL


 


Eosinophils # (Auto)


 0.1 


 


 


 0.0-0.3


10^3/uL


 


Basophils # (Auto)


 0.1 


 


 


 0.0-0.1


10^3/uL


 


Immature Granulocyte # (Auto)


 0.0 


 


 


 0.0-0.1


10^3/uL


 


Neutrophils % (Manual) 63     %


 


Lymphocytes % (Manual) 15     %


 


Monocytes % (Manual) 18     %


 


Eosinophils % (Manual) 0     %


 


Basophils % (Manual) 1     %


 


Band Neutrophils 3     %


 


Blood Morphology Comment NORMAL     


 


Glucometer  98     MG/DL








My Orders





Orders - JUAN HUDSON MD


Ed Iv/Invasive Line Start (12/15/22 11:26)


Cbc With Automated Diff (12/15/22 11:26)


Basic Metabolic Panel (12/15/22 11:26)


General/Regular (12/15/22 Lunch)


Manual Differential (12/15/22 11:20)





Vital Signs/I&O











 12/15/22





 11:21


 


Temp 35.7


 


Pulse 70


 


Resp 16


 


B/P (MAP) 131/99 (110)


 


Pulse Ox 99


 


O2 Delivery Room Air





Capillary Refill :


Progress Note :  


   Time:  13:55


Progress Note


Notified by the nurse that the patient's blood sugar is now up to 171.  He was a

difficult blood stick so labs were somewhat delayed.  He continues to be without

symptoms.  Blood sugar is back up into an acceptable range, do not anticipate 

that the patient will need to be admitted.  He is not on any oral hypoglycemics.

 Once we establish that his serum chemistry is at baseline/normal we will be 

able to send him back home to [facility].





Departure


Impression





   Primary Impression:  


   Hypoglycemia associated with diabetes


Disposition:  01 HOME, SELF-CARE


Condition:  Improved





Departure-Patient Inst.


Referrals:  


JOSE KARIMI MD (PCP/Family)


Primary Care Physician


Patient Instructions:  Low Blood Sugar in People With Diabetes





Add. Discharge Instructions:  


You should have your blood sugar checked every 2 or 3 hours the rest of the day 

while you are at home.





Resume your normal dosing of insulin tonight.





If you have any other new, concerning or emergent complaints, please come back 

to the Emergency Department for re-evaluation.





Copy


Copies To 1:   JOSE KARIMI MD, KATHRYN M MD         Dec 15, 2022 11:27

## 2023-08-09 LAB
BASOPHILS # BLD AUTO: 0.1 10^3/UL (ref 0–0.1)
BASOPHILS NFR BLD AUTO: 1 % (ref 0–10)
EOSINOPHIL # BLD AUTO: 0.1 10^3/UL (ref 0–0.3)
EOSINOPHIL NFR BLD AUTO: 1 % (ref 0–10)
ERYTHROCYTE [SEDIMENTATION RATE] IN BLOOD: 1 MM/HR (ref 0–30)
HCT VFR BLD CALC: 58 % (ref 40–54)
HGB BLD-MCNC: 19.5 G/DL (ref 13.3–17.7)
LYMPHOCYTES # BLD AUTO: 0.9 10^3/UL (ref 1–4)
LYMPHOCYTES NFR BLD AUTO: 16 % (ref 12–44)
MANUAL DIFFERENTIAL PERFORMED BLD QL: NO
MCH RBC QN AUTO: 33 PG (ref 25–34)
MCHC RBC AUTO-ENTMCNC: 33 G/DL (ref 32–36)
MCV RBC AUTO: 100 FL (ref 80–99)
MONOCYTES # BLD AUTO: 1.1 10^3/UL (ref 0–1)
MONOCYTES NFR BLD AUTO: 19 % (ref 0–12)
NEUTROPHILS # BLD AUTO: 3.6 10^3/UL (ref 1.8–7.8)
NEUTROPHILS NFR BLD AUTO: 63 % (ref 42–75)
PLATELET # BLD: 188 10^3/UL (ref 130–400)
PMV BLD AUTO: 9.9 FL (ref 9–12.2)
WBC # BLD AUTO: 5.8 10^3/UL (ref 4.3–11)

## 2023-08-11 ENCOUNTER — HOSPITAL ENCOUNTER (OUTPATIENT)
Dept: HOSPITAL 75 - RAD | Age: 68
End: 2023-08-11
Attending: INTERNAL MEDICINE
Payer: MEDICARE

## 2023-08-11 DIAGNOSIS — N28.1: Primary | ICD-10-CM

## 2023-08-11 PROCEDURE — 76770 US EXAM ABDO BACK WALL COMP: CPT

## 2023-08-16 ENCOUNTER — HOSPITAL ENCOUNTER (OUTPATIENT)
Dept: HOSPITAL 75 - ONC | Age: 68
LOS: 15 days | Discharge: HOME | End: 2023-08-31
Attending: INTERNAL MEDICINE
Payer: MEDICARE

## 2023-08-16 DIAGNOSIS — D75.1: Primary | ICD-10-CM

## 2023-08-16 LAB
RETICS #: 120 10E9/UL (ref 24–90)
RETICS/RBC NFR: 2.05 % (ref 0.5–2.4)

## 2023-08-16 PROCEDURE — 82607 VITAMIN B-12: CPT

## 2023-08-16 PROCEDURE — 99204 OFFICE O/P NEW MOD 45 MIN: CPT

## 2023-08-16 PROCEDURE — 83615 LACTATE (LD) (LDH) ENZYME: CPT

## 2023-08-16 PROCEDURE — 85025 COMPLETE CBC W/AUTO DIFF WBC: CPT

## 2023-08-16 PROCEDURE — 85652 RBC SED RATE AUTOMATED: CPT

## 2023-08-16 PROCEDURE — 85045 AUTOMATED RETICULOCYTE COUNT: CPT

## 2023-08-16 PROCEDURE — 82375 ASSAY CARBOXYHB QUANT: CPT

## 2023-08-16 PROCEDURE — 99214 OFFICE O/P EST MOD 30 MIN: CPT

## 2023-08-16 PROCEDURE — 81270 JAK2 GENE: CPT

## 2023-08-16 PROCEDURE — 82746 ASSAY OF FOLIC ACID SERUM: CPT

## 2023-08-16 PROCEDURE — 82668 ASSAY OF ERYTHROPOIETIN: CPT
